# Patient Record
Sex: MALE | Race: WHITE | NOT HISPANIC OR LATINO | Employment: FULL TIME | URBAN - METROPOLITAN AREA
[De-identification: names, ages, dates, MRNs, and addresses within clinical notes are randomized per-mention and may not be internally consistent; named-entity substitution may affect disease eponyms.]

---

## 2021-10-03 ENCOUNTER — HOSPITAL ENCOUNTER (EMERGENCY)
Facility: CLINIC | Age: 61
Discharge: HOME | End: 2021-10-03
Attending: EMERGENCY MEDICINE | Admitting: EMERGENCY MEDICINE
Payer: COMMERCIAL

## 2021-10-03 VITALS
SYSTOLIC BLOOD PRESSURE: 148 MMHG | WEIGHT: 245 LBS | OXYGEN SATURATION: 94 % | DIASTOLIC BLOOD PRESSURE: 97 MMHG | HEIGHT: 72 IN | RESPIRATION RATE: 22 BRPM | HEART RATE: 108 BPM | TEMPERATURE: 99.5 F | BODY MASS INDEX: 33.18 KG/M2

## 2021-10-03 DIAGNOSIS — J12.82 PNEUMONIA DUE TO 2019 NOVEL CORONAVIRUS: ICD-10-CM

## 2021-10-03 DIAGNOSIS — U07.1 PNEUMONIA DUE TO 2019 NOVEL CORONAVIRUS: ICD-10-CM

## 2021-10-03 PROCEDURE — 99282 EMERGENCY DEPT VISIT SF MDM: CPT | Performed by: EMERGENCY MEDICINE

## 2021-10-03 ASSESSMENT — ENCOUNTER SYMPTOMS
SORE THROAT: 0
VOMITING: 0
ABDOMINAL PAIN: 1
SHORTNESS OF BREATH: 1
DYSURIA: 0
MYALGIAS: 1
COUGH: 1
DIARRHEA: 0
FEVER: 1
NAUSEA: 0
EYE REDNESS: 0
HEADACHES: 1
EYE PAIN: 0

## 2021-10-03 ASSESSMENT — MIFFLIN-ST. JEOR: SCORE: 1954.31

## 2021-10-03 NOTE — DISCHARGE INSTRUCTIONS
"Discharge Instructions for COVID-19 Patients  You have--or may have--COVID-19. Please follow the instructions listed below.   If you have a weakened immune system, discuss with your doctor any other actions you need to take.  How can I protect others?  If you have symptoms (fever, cough, body aches or trouble breathing):  Stay home and away from others (self-isolate) until:  Your other symptoms have resolved (gotten better). And   You've had no fever--and no medicine that reduces fever--for 1 full day (24 hours). And   At least 10 days have passed since your symptoms started. (You may need to wait 20 days. Follow the advice of your care team.)  If you don't show symptoms, but testing showed that you have COVID-19:  Stay home and away from others (self-isolate) until at least 10 days have passed since the date of your first positive COVID-19 test.  During this time  Stay in your own room, even for meals. Use your own bathroom if you can.  Stay away from others in your home. No hugging, kissing or shaking hands. No visitors.  Don't go to work, school or anywhere else.  Clean \"high touch\" surfaces often (doorknobs, counters, handles). Use household cleaning spray or wipes.  You'll find a full list of  on the EPA website: www.epa.gov/pesticide-registration/list-n-disinfectants-use-against-sars-cov-2.  Cover your mouth and nose with a mask or other face covering to avoid spreading germs.  Wash your hands and face often. Use soap and water.  Caregivers in these groups are at risk for severe illness due to COVID-19:  People 65 years and older  People who live in a nursing home or long-term care facility  People with chronic disease (lung, heart, cancer, diabetes, kidney, liver, immunologic)  People who have a weakened immune system, including those who:  Are in cancer treatment  Take medicine that weakens the immune system, such as corticosteroids  Had a bone marrow or organ transplant  Have an immune " deficiency  Have poorly controlled HIV or AIDS  Are obese (body mass index of 40 or higher)  Smoke regularly  Caregivers should wear gloves while washing dishes, handling laundry and cleaning bedrooms and bathrooms.  Use caution when washing and drying laundry: Don't shake dirty laundry and use the warmest water setting that you can.  For more tips on managing your health at home, go to www.cdc.gov/coronavirus/2019-ncov/downloads/10Things.pdf.  How can I take care of myself at home?  Get lots of rest. Drink extra fluids (unless a doctor has told you not to).  Take Tylenol (acetaminophen) for fever or pain. If you have liver or kidney problems, ask your family doctor if it's okay to take Tylenol.   Adults can take either:   650 mg (two 325 mg pills) every 4 to 6 hours, or   1,000 mg (two 500 mg pills) every 8 hours as needed.  Note: Don't take more than 3,000 mg in one day. Acetaminophen is found in many medicines (both prescribed and over-the-counter medicines). Read all labels to be sure you don't take too much.   For children, check the Tylenol bottle for the right dose. The dose is based on the child's age or weight.  If you have other health problems (like cancer, heart failure, an organ transplant or severe kidney disease): Call your specialty clinic if you don't feel better in the next 2 days.  Know when to call 911. Emergency warning signs include:  Trouble breathing or shortness of breath  Pain or pressure in the chest that doesn't go away  Feeling confused like you haven't felt before, or not being able to wake up  Bluish-colored lips or face  Your doctor may have prescribed a blood thinner medicine. Follow their instructions.  Where can I get more information?   [facility] - About COVID-19:   [email or web address]  CDC - What to Do If You're Sick: www.cdc.gov/coronavirus/2019-ncov/about/steps-when-sick.html  CDC - Ending Home Isolation:  www.cdc.gov/coronavirus/2019-ncov/hcp/disposition-in-home-patients.html  CDC - Caring for Someone: www.cdc.gov/coronavirus/2019-ncov/if-you-are-sick/care-for-someone.html  [facility] - Interim Guidance for Hospital Discharge to Home: www.health.[facility].[place]/diseases/coronavirus/hcp/hospdischarge.pdf  Below are the COVID-19 hotlines at the [place] Department of Health ([facility]). Interpreters are available.  For health questions: Call 911-356-5514 or 1-599.167.7023 (7 a.m. to 7 p.m.)  For questions about schools and childcare: Call 246-111-4136 or 1-289.617.1090 (7 a.m. to 7 p.m.)    For informational purposes only. Not to replace the advice of your health care provider. Clinically reviewed by Dr. Austyn Matthew.   Copyright   2020 [facility]. All rights reserved. [employer] [id number] - REV 01/05/21.

## 2021-10-03 NOTE — ED PROVIDER NOTES
History     Chief Complaint   Patient presents with     Fatigue     HPI  Raul Harden is a 61 year old male with COVID positive test on 9/29//21 with fatigue, cough (productive), myalgias, fever, dyspnea which started 6 days ago.  Being treated with azithromycin.  Concerned about elevated heart rate. Primary provider at [facility]. Not vaccinated against COVID.  No loss of smell or taste.  Patient is a non-smoker.  Not currently taking any other medications than azithromycin.  No pain or swelling in his lower extremities.  Urine is concentrated.  Feels dehydrated.  Fevers at home up to 101.    The patient's PMHx, Surgical Hx, Allergies, and Medications were all reviewed with the patient.    Allergies:  Allergies   Allergen Reactions     Penicillins Unknown       Problem List:    There are no problems to display for this patient.       Past Medical History:    No past medical history on file.    Past Surgical History:    Past Surgical History:   Procedure Laterality Date     TONSILLECTOMY         Family History:    No family history on file.    Social History:  Marital Status:   [2]  Social History     Tobacco Use     Smoking status: Never Smoker   Substance Use Topics     Alcohol use: No     Drug use: Not on file        Medications:    No current outpatient medications on file.        Review of Systems   Constitutional: Positive for fever.   HENT: Negative for congestion and sore throat.    Eyes: Negative for pain and redness.   Respiratory: Positive for cough and shortness of breath.    Cardiovascular: Negative for chest pain.   Gastrointestinal: Positive for abdominal pain. Negative for diarrhea, nausea and vomiting.   Genitourinary: Negative for dysuria.   Musculoskeletal: Positive for myalgias.   Skin: Negative for rash.   Neurological: Positive for headaches.       Physical Exam   BP: (!) 148/97  Pulse: 107  Temp: 99.5  F (37.5  C)  Resp: 22  Height: 182.9 cm (6')  Weight: 111.1 kg  (245 lb)  SpO2: 95 %    Physical Exam  GEN: Awake, alert, and cooperative. No acute distress.  Resting comfortably on cart  HENT: MMM. External ears and nose normal bilaterally.  EYES: EOM intact. Conjunctiva clear. No discharge.   NECK: Symmetric, freely mobile.   CV : Tachycardic rate.  Regular rhythm.  PULM: Normal effort.  Speaking full sentences no audible wheezing.  ABD: Soft, non-tender, non-distended. No rebound or guarding.   NEURO: Normal speech. Following commands.   EXT: No gross deformity.  No calf edema or tenderness.  No pitting pedal or pretibial edema.  INT: Warm. No diaphoresis. Normal color.        ED Course        Procedures          Critical Care time:  none               No results found for this or any previous visit (from the past 24 hour(s)).    Medications - No data to display    Assessments & Plan (with Medical Decision Making)   61 year old male with 6 days of URI symptoms with Covid positive test 4 days ago.  Concerned about dehydration and elevated heart rate.  No chest pain.  Cough and dyspnea with exertion.  No history of clot.  Patient is non-smoker.  No lower extremity edema or erythema.  No lower extremity tenderness.  No pleuritic chest discomfort.  Nontender abdomen.  Speaking full sentences.  Oxygen saturations in mid 90s on room air.  Patient's marching in place for 30 seconds with no decrease in oxygen saturation.  Patient does not have any comorbidities which will qualify him for monoclonal antibody treatment.  Get well loop referral and home monitoring ordered.  Patient given pulse oximeter.  Fill papers appropriate for discharge.  Discussed natural course of Covid and return precautions.    I have reviewed the nursing notes.         New Prescriptions    No medications on file       Final diagnoses:   Pneumonia due to 2019 novel coronavirus     Tyree Redmond MD    10/3/2021   [facility] EMERGENCY DEPT    Disclaimer: This note consists of words and  symbols derived from keyboarding and dictation using voice recognition software.  As a result, there may be errors that have gone undetected.  Please consider this when interpreting information found in this note.             Tyree Redmond MD  10/03/21 0752

## 2021-10-03 NOTE — ED TRIAGE NOTES
Pt diagnosed with covid 3 days ago noticed increased pulse of 110's over the past two days, general fatigue

## 2021-10-04 ENCOUNTER — PATIENT OUTREACH (OUTPATIENT)
Dept: CARE COORDINATION | Facility: CLINIC | Age: 61
End: 2021-10-04

## 2021-10-04 NOTE — PROGRESS NOTES
Care Coordination Hospital/ED Discharge Follow up Note  Pt had a red flag alert in getWell Loop for O2 sats < 92%.  Pt reports O2 sats 91%. Pt is speaking in full sentences.    Hospital/ED Discharge date: 10/3/21    Reason/Diagnosis for Hospital/ED visit: COVID PNA    Are you feeling better, the same, or worse since your Hospital/ED visit? better    Symptoms:     Cough -  persistent, productive tenacious    Shortness of breath:  shortness of breath walking room to room     Chest pain:  No    Fever: No    Fatigue:  Yes    Pulse Oximeter/Oxygen Questions:    Were you sent home with a pulse oximeter?  Yes    Are you currently utilizing the pulse oximeter?  Yes    Do you understand how to use the home oximeter?  Yes    What are your current oxygen saturation levels?  91%    Oxygen saturation levels in ED/IP:  94-95%    Were you sent home with home oxygen?  No    Medications:    Were you prescribed any new medications?  No  Follow Up:    Do you have a follow up appointment scheduled with your PCP or specialist?  No. Pt will schedul    Do you have a plan in place in the event of an emergency?  Yes    If patient is established or planning to establish primary care within [facility], Care Coordination was offered. Care Coordination accepted/declined:  No    GetWell Loop invitation received?  Yes    GetWell Loop invitation accepted, pending patient activation or declined:  activated  Instructed pt to drink extra fluids to maintain hydration and to do deep breathing exercises.  Reviewed when to return to ED (O2 sats <90%, increased SOA, chest pain, worsening cough)  Through GetWell Loop, sent pt cough self-care ideas. Suggested pt try Mucinex for the tenacious sputum.      Adriana Taylor RN  [facility] Care Coordination

## 2021-10-08 ENCOUNTER — PATIENT OUTREACH (OUTPATIENT)
Dept: CARE COORDINATION | Facility: CLINIC | Age: 61
End: 2021-10-08

## 2021-10-08 NOTE — TELEPHONE ENCOUNTER
Received notification of patient's report of worsening shortness of breath via [facility]. Called patient and left message. Will make second attempt shortly

## 2021-10-08 NOTE — TELEPHONE ENCOUNTER
Called patient. 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 He has tested positive for COVID-19 and wonders when he will feel better. Course of illness discussed. Will send patient information via [facility] for home treatment measures for management of cough, worrisome signs/symptoms that require urgent medical evaluation and 24/7 [facility] [place] [facility] phone number should patient have questions or concerns after hours. Patient verbalizes agreement with plan.